# Patient Record
Sex: MALE | Race: WHITE | URBAN - METROPOLITAN AREA
[De-identification: names, ages, dates, MRNs, and addresses within clinical notes are randomized per-mention and may not be internally consistent; named-entity substitution may affect disease eponyms.]

---

## 2017-01-30 NOTE — PATIENT DISCUSSION
GLAUCOMA SUSPECT, OU : ENLARGED OPTIC NERVE CUPPING. ELEVATED IOP OU. CONTINUE TO MONITOR AND RETURN FOR FOLLOW UP WITH DR TAN. IOP MAY DECREASE WITH PHACO SURGERY ONLY.

## 2017-01-30 NOTE — PATIENT DISCUSSION
HTN RETINOPATHY: EMPHASIZED IMPORTANCE OF GOOD BLOOD PRESSURE CONTROL TO MINIMIZE RISK OF VASCULAR COMPLICATIONS IN THE EYE.

## 2017-01-30 NOTE — PATIENT DISCUSSION
CLEARED FOR PHACO OU. RECOMMEND STARTING NSAID EYEDROPS 1 WEEK BEFORE PHACO AND CONTINUE FOR 6-8 WEEKS AFTER PHACO IN [facility], TO REDUCE RISK OF CSDME.

## 2017-01-30 NOTE — PATIENT DISCUSSION
Continue: Lotemax (loteprednol etabonate): gel: 0.5% 1 drop twice a day as directed into both eyes 12-

## 2017-01-30 NOTE — PATIENT DISCUSSION
DIABETIC MACULAR EDEMA, OS: Tx options discussed including eyedrops vs. focal laser vs. intravitreal injections vs. observation only. Emphasized tighter BG control. Patient elected to observe only at this time and proceed with phaco surgery OU with Dr Kemi Murrieta.

## 2017-08-31 NOTE — PATIENT DISCUSSION
Diabetic Macular Edema Counseling:  I have explained to the patient at length the diagnosis of diabetic macular edema and its pathophysiology. I have discussed the various treatment options including medications and surgery. I stressed the importance of management of this condition in order to decrease the risk of permanent damage to the eye. Patient was advised to call if there is any decrease in vision or loss of contrast sensitivity or color vision. Return for follow-up as scheduled.

## 2017-12-18 NOTE — PATIENT DISCUSSION
DIABETIC MACULAR EDEMA, OS; TREATMENT OPTIONS DISCUSSED. PATIENT NOT PARTICULARLY BOTHERED. START BROMFENAC BID OS ONLY. RETURN IN 3 MONTHS.

## 2018-04-05 NOTE — PATIENT DISCUSSION
New Prescription: Prolensa (bromfenac): drops: 0.07% 1 drop twice a day as directed into left eye 04-

## 2021-06-15 NOTE — PATIENT DISCUSSION
DIABETIC MACULAR EDEMA, OS:  AVASTIN (1.25MG) INTRAVITREAL INJECTION TODAY. RETURN AS SCHEDULED. surgery

## 2021-08-06 ENCOUNTER — NEW CATARACT EVAL (OUTPATIENT)
Dept: URBAN - METROPOLITAN AREA CLINIC 53 | Facility: CLINIC | Age: 76
End: 2021-08-06

## 2021-08-06 DIAGNOSIS — H35.3131: ICD-10-CM

## 2021-08-06 DIAGNOSIS — E11.9: ICD-10-CM

## 2021-08-06 DIAGNOSIS — H25.13: ICD-10-CM

## 2021-08-06 PROCEDURE — 92004 COMPRE OPH EXAM NEW PT 1/>: CPT

## 2021-08-06 PROCEDURE — 92134 CPTRZ OPH DX IMG PST SGM RTA: CPT

## 2021-08-06 PROCEDURE — 92015 DETERMINE REFRACTIVE STATE: CPT

## 2021-08-06 ASSESSMENT — KERATOMETRY
OS_AXISANGLE2_DEGREES: 170
OS_K1POWER_DIOPTERS: 44.50
OD_K1POWER_DIOPTERS: 44.25
OD_AXISANGLE2_DEGREES: 174
OD_AXISANGLE_DEGREES: 84
OS_K2POWER_DIOPTERS: 46.25
OS_AXISANGLE_DEGREES: 80
OD_K2POWER_DIOPTERS: 46.00

## 2021-08-06 ASSESSMENT — TONOMETRY
OD_IOP_MMHG: 10
OS_IOP_MMHG: 12

## 2021-08-06 ASSESSMENT — VISUAL ACUITY
OD_CC: J3 @ 13IN
OS_SC: 20/60+1
OD_SC: 20/30
OD_GLARE: 20/300
OS_PH: 20/25
OD_CC: 20/40-1
OS_CC: 20/20
OD_PH: 20/25
OU_CC: 20/20
OS_GLARE: 20/25
OS_GLARE: 20/25
OD_GLARE: 20/25
OU_CC: J1 @ 13IN
OS_CC: J2 @ 13IN

## 2022-03-22 NOTE — PATIENT DISCUSSION
Hypertensive Retinopathy Counseling: The pathophysiology of hypertensive retinopathy and associated comorbidity was discussed with the patient. The importance of compliance with medications, and good blood pressure control was emphasized to limit risk of retinal ischemia and loss of vision. The patient should return for follow up immediately if any change of vision. Biopsy Photograph Reviewed: Yes

## 2022-07-01 ENCOUNTER — COMPREHENSIVE EXAM (OUTPATIENT)
Dept: URBAN - METROPOLITAN AREA CLINIC 53 | Facility: CLINIC | Age: 77
End: 2022-07-01

## 2022-07-01 DIAGNOSIS — H25.13: ICD-10-CM

## 2022-07-01 DIAGNOSIS — H17.11: ICD-10-CM

## 2022-07-01 DIAGNOSIS — H35.3131: ICD-10-CM

## 2022-07-01 DIAGNOSIS — E11.9: ICD-10-CM

## 2022-07-01 PROCEDURE — 92134 CPTRZ OPH DX IMG PST SGM RTA: CPT

## 2022-07-01 PROCEDURE — 92014 COMPRE OPH EXAM EST PT 1/>: CPT

## 2022-07-01 ASSESSMENT — VISUAL ACUITY
OS_GLARE: 20/20
OD_CC: 20/25-2
OD_GLARE: 20/30
OU_CC: J1+
OS_CC: 20/20
OD_GLARE: 20/25
OS_GLARE: 20/20

## 2022-07-01 ASSESSMENT — TONOMETRY
OS_IOP_MMHG: 12
OD_IOP_MMHG: 12

## 2022-07-01 ASSESSMENT — KERATOMETRY
OS_AXISANGLE2_DEGREES: 170
OS_K1POWER_DIOPTERS: 44.50
OD_AXISANGLE2_DEGREES: 174
OD_AXISANGLE_DEGREES: 84
OS_K2POWER_DIOPTERS: 46.25
OS_AXISANGLE_DEGREES: 80
OD_K2POWER_DIOPTERS: 46.00
OD_K1POWER_DIOPTERS: 44.25

## 2022-08-22 ENCOUNTER — PRE-OP/H&P (OUTPATIENT)
Dept: URBAN - METROPOLITAN AREA CLINIC 49 | Facility: CLINIC | Age: 77
End: 2022-08-22

## 2022-08-22 DIAGNOSIS — H17.11: ICD-10-CM

## 2022-08-22 PROCEDURE — PREOP PRE OP VISIT

## 2022-08-22 RX ORDER — MOXIFLOXACIN OPHTHALMIC 5 MG/ML: 1 SOLUTION/ DROPS OPHTHALMIC

## 2022-08-22 ASSESSMENT — TONOMETRY
OD_IOP_MMHG: 15
OS_IOP_MMHG: 15

## 2022-08-22 ASSESSMENT — KERATOMETRY
OD_AXISANGLE2_DEGREES: 174
OD_AXISANGLE_DEGREES: 84
OS_K2POWER_DIOPTERS: 46.25
OD_K1POWER_DIOPTERS: 44.25
OD_K2POWER_DIOPTERS: 46.00
OS_K1POWER_DIOPTERS: 44.50
OS_AXISANGLE2_DEGREES: 170
OS_AXISANGLE_DEGREES: 80

## 2022-08-22 ASSESSMENT — VISUAL ACUITY
OS_CC: 20/25-2
OD_CC: 20/30

## 2022-08-30 ENCOUNTER — SURGERY/PROCEDURE (OUTPATIENT)
Dept: URBAN - METROPOLITAN AREA SURGERY 16 | Facility: SURGERY | Age: 77
End: 2022-08-30

## 2022-08-30 ENCOUNTER — POST-OP (OUTPATIENT)
Dept: URBAN - METROPOLITAN AREA CLINIC 48 | Facility: LOCATION | Age: 77
End: 2022-08-30

## 2022-08-30 DIAGNOSIS — Z98.890: ICD-10-CM

## 2022-08-30 DIAGNOSIS — H17.11: ICD-10-CM

## 2022-08-30 PROCEDURE — 99024 POSTOP FOLLOW-UP VISIT: CPT

## 2022-08-30 PROCEDURE — 65400 REMOVAL OF EYE LESION: CPT

## 2022-08-30 ASSESSMENT — KERATOMETRY
OS_AXISANGLE_DEGREES: 80
OD_K2POWER_DIOPTERS: 46.00
OD_K1POWER_DIOPTERS: 44.25
OS_AXISANGLE2_DEGREES: 170
OD_AXISANGLE2_DEGREES: 174
OS_K2POWER_DIOPTERS: 46.25
OD_AXISANGLE_DEGREES: 84
OD_K1POWER_DIOPTERS: 44.25
OS_K1POWER_DIOPTERS: 44.50
OS_AXISANGLE2_DEGREES: 170
OD_AXISANGLE_DEGREES: 84
OS_AXISANGLE_DEGREES: 80
OS_K1POWER_DIOPTERS: 44.50
OD_K2POWER_DIOPTERS: 46.00
OD_AXISANGLE2_DEGREES: 174
OS_K2POWER_DIOPTERS: 46.25

## 2022-08-30 ASSESSMENT — VISUAL ACUITY: OD_SC: 20/200

## 2022-08-30 NOTE — PATIENT DISCUSSION
Acuvue Oasys 8.4 14.0 PLANO SPH BCL inserted today, good fit. Patient advised the CL will be removed at next visit with Dr. Keyana Baldwin.

## 2022-08-31 ENCOUNTER — POST-OP (OUTPATIENT)
Dept: URBAN - METROPOLITAN AREA CLINIC 50 | Facility: CLINIC | Age: 77
End: 2022-08-31

## 2022-08-31 DIAGNOSIS — Z98.890: ICD-10-CM

## 2022-08-31 PROCEDURE — 99024 POSTOP FOLLOW-UP VISIT: CPT

## 2022-08-31 ASSESSMENT — KERATOMETRY
OD_AXISANGLE_DEGREES: 84
OD_K1POWER_DIOPTERS: 44.25
OS_AXISANGLE2_DEGREES: 170
OD_AXISANGLE2_DEGREES: 174
OS_K2POWER_DIOPTERS: 46.25
OS_K1POWER_DIOPTERS: 44.50
OS_AXISANGLE_DEGREES: 80
OD_K2POWER_DIOPTERS: 46.00

## 2022-08-31 ASSESSMENT — VISUAL ACUITY: OD_CC: 20/80

## 2022-09-12 ENCOUNTER — POST-OP (OUTPATIENT)
Dept: URBAN - METROPOLITAN AREA CLINIC 49 | Facility: CLINIC | Age: 77
End: 2022-09-12

## 2022-09-12 DIAGNOSIS — Z98.890: ICD-10-CM

## 2022-09-12 PROCEDURE — 99024 POSTOP FOLLOW-UP VISIT: CPT

## 2022-09-12 ASSESSMENT — KERATOMETRY
OD_K1POWER_DIOPTERS: 44.25
OD_AXISANGLE_DEGREES: 84
OS_AXISANGLE_DEGREES: 80
OD_AXISANGLE2_DEGREES: 174
OS_K1POWER_DIOPTERS: 44.50
OS_K2POWER_DIOPTERS: 46.25
OD_K2POWER_DIOPTERS: 46.00
OS_AXISANGLE2_DEGREES: 170

## 2022-09-12 ASSESSMENT — VISUAL ACUITY
OS_CC: 20/20-1
OD_PH: 20/40
OD_CC: 20/70+1

## 2022-11-14 ENCOUNTER — ESTABLISHED PATIENT (OUTPATIENT)
Dept: URBAN - METROPOLITAN AREA CLINIC 49 | Facility: CLINIC | Age: 77
End: 2022-11-14

## 2022-11-14 DIAGNOSIS — H01.026: ICD-10-CM

## 2022-11-14 DIAGNOSIS — H02.886: ICD-10-CM

## 2022-11-14 DIAGNOSIS — H02.883: ICD-10-CM

## 2022-11-14 DIAGNOSIS — H01.023: ICD-10-CM

## 2022-11-14 PROCEDURE — 92012 INTRM OPH EXAM EST PATIENT: CPT

## 2022-11-14 RX ORDER — ERYTHROMYCIN 5 MG/G
OINTMENT OPHTHALMIC EVERY EVENING
Start: 2022-11-14

## 2022-11-14 ASSESSMENT — TONOMETRY
OD_IOP_MMHG: 14
OS_IOP_MMHG: 14

## 2022-11-14 ASSESSMENT — VISUAL ACUITY
OS_CC: 20/40
OD_CC: 20/60+1
OS_PH: 20/25
OU_CC: 20/30

## 2022-12-23 ENCOUNTER — ESTABLISHED PATIENT (OUTPATIENT)
Dept: URBAN - METROPOLITAN AREA CLINIC 53 | Facility: CLINIC | Age: 77
End: 2022-12-23

## 2022-12-23 PROCEDURE — 92012 INTRM OPH EXAM EST PATIENT: CPT

## 2022-12-23 RX ORDER — TOBRAMYCIN 3 MG/ML: 1 SOLUTION/ DROPS OPHTHALMIC

## 2022-12-23 RX ORDER — CEPHALEXIN 500 MG/1: 1 CAPSULE ORAL TWICE A DAY

## 2022-12-23 ASSESSMENT — TONOMETRY
OD_IOP_MMHG: 15
OS_IOP_MMHG: 15

## 2022-12-23 ASSESSMENT — VISUAL ACUITY
OS_CC: 20/25-2
OD_CC: 20/60
OD_PH: 20/50
OU_CC: 20/25-2

## 2023-01-20 ENCOUNTER — FOLLOW UP (OUTPATIENT)
Dept: URBAN - METROPOLITAN AREA CLINIC 53 | Facility: CLINIC | Age: 78
End: 2023-01-20

## 2023-01-20 DIAGNOSIS — H02.886: ICD-10-CM

## 2023-01-20 DIAGNOSIS — H01.026: ICD-10-CM

## 2023-01-20 DIAGNOSIS — H01.023: ICD-10-CM

## 2023-01-20 DIAGNOSIS — H02.883: ICD-10-CM

## 2023-01-20 DIAGNOSIS — H10.501: ICD-10-CM

## 2023-01-20 PROCEDURE — 92012 INTRM OPH EXAM EST PATIENT: CPT

## 2023-01-20 ASSESSMENT — VISUAL ACUITY
OD_CC: 20/50
OS_CC: 20/20-2
OD_PH: 20/30

## 2023-01-20 ASSESSMENT — TONOMETRY
OD_IOP_MMHG: 16
OS_IOP_MMHG: 15